# Patient Record
Sex: FEMALE | Race: WHITE | NOT HISPANIC OR LATINO | URBAN - METROPOLITAN AREA
[De-identification: names, ages, dates, MRNs, and addresses within clinical notes are randomized per-mention and may not be internally consistent; named-entity substitution may affect disease eponyms.]

---

## 2017-01-19 ENCOUNTER — APPOINTMENT (RX ONLY)
Dept: URBAN - METROPOLITAN AREA CLINIC 343 | Facility: CLINIC | Age: 22
Setting detail: DERMATOLOGY
End: 2017-01-19

## 2017-01-19 DIAGNOSIS — L70.0 ACNE VULGARIS: ICD-10-CM

## 2017-01-19 PROBLEM — L20.84 INTRINSIC (ALLERGIC) ECZEMA: Status: ACTIVE | Noted: 2017-01-19

## 2017-01-19 PROBLEM — J45.909 UNSPECIFIED ASTHMA, UNCOMPLICATED: Status: ACTIVE | Noted: 2017-01-19

## 2017-01-19 PROBLEM — L85.3 XEROSIS CUTIS: Status: ACTIVE | Noted: 2017-01-19

## 2017-01-19 PROBLEM — F41.9 ANXIETY DISORDER, UNSPECIFIED: Status: ACTIVE | Noted: 2017-01-19

## 2017-01-19 PROCEDURE — 99202 OFFICE O/P NEW SF 15 MIN: CPT

## 2017-01-19 PROCEDURE — ? PRESCRIPTION

## 2017-01-19 PROCEDURE — ? COUNSELING

## 2017-01-19 PROCEDURE — ? TREATMENT REGIMEN

## 2017-01-19 RX ORDER — DOXYCYCLINE HYCLATE 120 MG/1
1 TABLET, DELAYED RELEASE ORAL BID
Qty: 60 | Refills: 2 | Status: ERX | COMMUNITY
Start: 2017-01-19

## 2017-01-19 RX ORDER — CLINDAMYCIN PHOSPHATE AND BENZOYL PEROXIDE 10; 25 MG/G; MG/G
1 GEL TOPICAL QD
Qty: 1 | Refills: 2 | Status: ERX | COMMUNITY
Start: 2017-01-19

## 2017-01-19 RX ADMIN — CLINDAMYCIN PHOSPHATE AND BENZOYL PEROXIDE 1: 10; 25 GEL TOPICAL at 21:56

## 2017-01-19 RX ADMIN — DOXYCYCLINE HYCLATE 1: 120 TABLET, DELAYED RELEASE ORAL at 21:56

## 2017-01-19 ASSESSMENT — LOCATION SIMPLE DESCRIPTION DERM
LOCATION SIMPLE: RIGHT CHEEK
LOCATION SIMPLE: LEFT CHEEK

## 2017-01-19 ASSESSMENT — LOCATION ZONE DERM: LOCATION ZONE: FACE

## 2017-01-19 ASSESSMENT — LOCATION DETAILED DESCRIPTION DERM
LOCATION DETAILED: LEFT INFERIOR LATERAL MALAR CHEEK
LOCATION DETAILED: RIGHT INFERIOR CENTRAL MALAR CHEEK

## 2017-01-19 NOTE — PROCEDURE: TREATMENT REGIMEN
Plan: Patient was told to call the office if the acne worsens or if there is no improvement on the treatment regiment given. The patient was counseled about the scarring, she was told there was too much inflammation at this time to start laser, but in 6 weeks she can come in and talk about laser treatment for the scarring. Patient is to recheck in 6 weeks \\n \\n
Otc Regimen: continue Clean and Clear daily wash
Initiate Treatment: Doryx  mg tablet, delayed release, SIG: take 1 tablet by mouth twice a day with food    \\nAcanya 1.2 %-2.5 % topical gel with pump, SIG: apply to break outs only, once a day at bedtime
Detail Level: Simple